# Patient Record
Sex: MALE | Race: ASIAN | NOT HISPANIC OR LATINO
[De-identification: names, ages, dates, MRNs, and addresses within clinical notes are randomized per-mention and may not be internally consistent; named-entity substitution may affect disease eponyms.]

---

## 2019-10-31 ENCOUNTER — TRANSCRIPTION ENCOUNTER (OUTPATIENT)
Age: 49
End: 2019-10-31

## 2019-12-10 ENCOUNTER — TRANSCRIPTION ENCOUNTER (OUTPATIENT)
Age: 49
End: 2019-12-10

## 2020-04-25 ENCOUNTER — MESSAGE (OUTPATIENT)
Age: 50
End: 2020-04-25

## 2020-05-03 ENCOUNTER — APPOINTMENT (OUTPATIENT)
Dept: DISASTER EMERGENCY | Facility: CLINIC | Age: 50
End: 2020-05-03

## 2020-05-04 LAB
SARS-COV-2 IGG SERPL IA-ACNC: 0 INDEX
SARS-COV-2 IGG SERPL QL IA: NEGATIVE

## 2020-09-30 ENCOUNTER — APPOINTMENT (OUTPATIENT)
Dept: ORTHOPEDIC SURGERY | Facility: CLINIC | Age: 50
End: 2020-09-30
Payer: COMMERCIAL

## 2020-09-30 VITALS
DIASTOLIC BLOOD PRESSURE: 80 MMHG | WEIGHT: 142 LBS | BODY MASS INDEX: 22.29 KG/M2 | SYSTOLIC BLOOD PRESSURE: 123 MMHG | HEART RATE: 96 BPM | OXYGEN SATURATION: 97 % | HEIGHT: 67 IN

## 2020-09-30 DIAGNOSIS — S46.011A STRAIN OF MUSCLE(S) AND TENDON(S) OF THE ROTATOR CUFF OF RIGHT SHOULDER, INITIAL ENCOUNTER: ICD-10-CM

## 2020-09-30 DIAGNOSIS — M25.511 PAIN IN RIGHT SHOULDER: ICD-10-CM

## 2020-09-30 DIAGNOSIS — S46.011S STRAIN OF MUSCLE(S) AND TENDON(S) OF THE ROTATOR CUFF OF RIGHT SHOULDER, SEQUELA: ICD-10-CM

## 2020-09-30 PROCEDURE — 99204 OFFICE O/P NEW MOD 45 MIN: CPT

## 2020-09-30 PROCEDURE — 73030 X-RAY EXAM OF SHOULDER: CPT | Mod: RT

## 2020-10-05 NOTE — HISTORY OF PRESENT ILLNESS
[___ days] : [unfilled] day(s) ago [10] : a maximum pain level of 10/10 [Constant] : ~He/She~ states the symptoms seem to be constant [Lifting] : worsened by lifting [de-identified] : Pt presents for initial evaluation with pain in his right shoulder there is no known injury. Pt states he had a similar pain 1 1/2 years ago and had physical therapy  prescribed by his PCP, that was helpful. Recently he was helping a friend moving things and that might have aggravated his right shoulder. He has taken Advil for pain with no real relief. 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 [de-identified] : certain movements [de-identified] : lying down.

## 2020-10-05 NOTE — PHYSICAL EXAM
[de-identified] : Physical examination discloses mild muscle spasm to the right para trapezial musculature of the cervical spine.  Positive impingement on overhead motion at 160 degrees.  Positive radiation of pain to the right shoulder down to his elbow. [de-identified] : X-rays taken of the right shoulder and AP transscapular lateral and axillary views are nonspecific.  No acute soft tissue or osseous changes are noted

## 2020-10-05 NOTE — DISCUSSION/SUMMARY
[de-identified] : Patient was informed of his findings and advised to start physical therapy meloxicam rest use heat and may undergo MRI evaluation of the cervical spine as well as the right shoulder if symptoms persist or worsen.  Follow-up in 2 weeks to check his progress

## 2020-10-07 ENCOUNTER — OUTPATIENT (OUTPATIENT)
Dept: OUTPATIENT SERVICES | Facility: HOSPITAL | Age: 50
LOS: 1 days | End: 2020-10-07
Payer: COMMERCIAL

## 2020-10-07 ENCOUNTER — APPOINTMENT (OUTPATIENT)
Dept: MRI IMAGING | Facility: CLINIC | Age: 50
End: 2020-10-07
Payer: COMMERCIAL

## 2020-10-07 ENCOUNTER — RESULT REVIEW (OUTPATIENT)
Age: 50
End: 2020-10-07

## 2020-10-07 DIAGNOSIS — S46.011A STRAIN OF MUSCLE(S) AND TENDON(S) OF THE ROTATOR CUFF OF RIGHT SHOULDER, INITIAL ENCOUNTER: ICD-10-CM

## 2020-10-07 PROCEDURE — 72141 MRI NECK SPINE W/O DYE: CPT

## 2020-10-07 PROCEDURE — 72141 MRI NECK SPINE W/O DYE: CPT | Mod: 26

## 2020-10-07 PROCEDURE — 73221 MRI JOINT UPR EXTREM W/O DYE: CPT | Mod: 26,RT

## 2020-10-07 PROCEDURE — 73221 MRI JOINT UPR EXTREM W/O DYE: CPT

## 2020-10-30 ENCOUNTER — APPOINTMENT (OUTPATIENT)
Dept: ORTHOPEDIC SURGERY | Facility: CLINIC | Age: 50
End: 2020-10-30
Payer: COMMERCIAL

## 2020-10-30 VITALS
OXYGEN SATURATION: 97 % | DIASTOLIC BLOOD PRESSURE: 83 MMHG | BODY MASS INDEX: 22.29 KG/M2 | SYSTOLIC BLOOD PRESSURE: 124 MMHG | HEART RATE: 89 BPM | HEIGHT: 67 IN | WEIGHT: 142 LBS

## 2020-10-30 PROCEDURE — 99214 OFFICE O/P EST MOD 30 MIN: CPT

## 2020-10-30 PROCEDURE — 99072 ADDL SUPL MATRL&STAF TM PHE: CPT

## 2020-10-30 RX ORDER — METHYLPREDNISOLONE 4 MG/1
4 TABLET ORAL
Qty: 1 | Refills: 0 | Status: ACTIVE | COMMUNITY
Start: 2020-10-30 | End: 1900-01-01

## 2020-10-30 NOTE — HISTORY OF PRESENT ILLNESS
[___ mths] : [unfilled] month(s) ago [10] : a maximum pain level of 10/10 [Constant] : ~He/She~ states the symptoms seem to be constant [Lifting] : worsened by lifting [Ice] : relieved by ice [Rest] : relieved by rest [de-identified] : Pt returns for follow up for pain in his right shoulder, pt is right hand dominant, pt  had MRI cervical and Right shoulder  on 10/7/2020, pt has been attending physical therapy for 5 sessions and feels he is back to his original pain. Pt is taking meloxicam with no real relief.  [de-identified] : certain movements

## 2020-10-30 NOTE — DISCUSSION/SUMMARY
[de-identified] : Patient's condition was reviewed with his MRI findings of his shoulder and neck.  The patient was advised to seek further follow-up assessment and treatment under the expertise of a spine orthopedic specialist as well as referral to pain management.  Interim he was placed on a Medrol Dosepak

## 2020-10-30 NOTE — PHYSICAL EXAM
[de-identified] : Physical examination of the patient's right shoulder is nonspecific.  His pain emanates from the base of his neck on the right side radiating down his arm with occasional numbness and tingling to the index and long fingers.  Diminished biceps and brachioradialis extends on the right side.

## 2020-11-04 ENCOUNTER — APPOINTMENT (OUTPATIENT)
Dept: ORTHOPEDIC SURGERY | Facility: CLINIC | Age: 50
End: 2020-11-04
Payer: COMMERCIAL

## 2020-11-04 VITALS
HEART RATE: 85 BPM | SYSTOLIC BLOOD PRESSURE: 122 MMHG | DIASTOLIC BLOOD PRESSURE: 80 MMHG | HEIGHT: 67 IN | BODY MASS INDEX: 22.29 KG/M2 | WEIGHT: 142 LBS

## 2020-11-04 DIAGNOSIS — Z78.9 OTHER SPECIFIED HEALTH STATUS: ICD-10-CM

## 2020-11-04 PROCEDURE — 99214 OFFICE O/P EST MOD 30 MIN: CPT

## 2020-11-04 PROCEDURE — 99072 ADDL SUPL MATRL&STAF TM PHE: CPT

## 2020-11-04 PROCEDURE — 72050 X-RAY EXAM NECK SPINE 4/5VWS: CPT

## 2020-11-04 RX ORDER — PREDNISONE 10 MG/1
10 TABLET ORAL
Qty: 39 | Refills: 0 | Status: ACTIVE | COMMUNITY
Start: 2020-11-04 | End: 1900-01-01

## 2020-11-04 RX ORDER — TIZANIDINE 2 MG/1
2 TABLET ORAL EVERY 6 HOURS
Qty: 24 | Refills: 0 | Status: ACTIVE | COMMUNITY
Start: 2020-11-04 | End: 1900-01-01

## 2020-11-04 RX ORDER — OMEPRAZOLE 20 MG/1
20 CAPSULE, DELAYED RELEASE ORAL TWICE DAILY
Qty: 30 | Refills: 0 | Status: ACTIVE | COMMUNITY
Start: 2020-11-04 | End: 1900-01-01

## 2020-11-04 NOTE — PHYSICAL EXAM
[de-identified] : Cervical Physical Exam\par \par Gait -normal\par \par Station -normal\par \par Sagittal balance -normal\par \par \par Horizontal gaze -maintained\par \par Heel walk -normal\par \par Toe walk -normal\par \par Reflexes\par Biceps -normal\par Triceps -normal\par Brachioradialis -normal\par Patellar -normal\par Gastroc -normal\par Clonus -no\par \par Jacobs´s -normal\par \par Shoulder Exam -normal\par \par Spurling´s -positive on right\par \par Wrist Pulses -2+ radial/ulnar\par \par Foot Pulses -2+ DP/PT\par \par Cervical range of motion -globally reduced\par \par Sensation \par C5-T1 sensation intact to light touch bilaterally except for right thumb and index finger\par \par L1-S1 sensation intact to light touch bilaterally\par \par Motor\par \par \par 	Deltoid	Biceps	Triceps	WF	WE	IO	\par Right	5/5	4/5	5/5	5/5	4/5	5/5	5/5\par Left	5/5	5/5	5/5	5/5	5/5	5/5	5/5\par \par \par 	IP	Quad	HS	TA	Gastroc	EHL\par Right	5/5	5/5	5/5	5/5	5/5	5/5\par Left	5/5	5/5	5/5	5/5	5/5	5/5\par \par \par  [de-identified] : Cervical radiographs\par C5-C6 disc degeneration\par C5-C6 disc height loss\par Cervical lordosis maintained\par \par Cervical MRI reviewed\par C5-C6 with disc herniation\par Right-sided foraminal narrowing at C5-C6\par

## 2020-11-04 NOTE — ASSESSMENT
[FreeTextEntry1] : This is a 50-year-old male that is coming in today complaining of right-sided C6 radiculopathy in the setting of a C5-C6 disc herniation.  I I believe he would benefit from a C5-C6 translaminar epidural steroid injection.  This was prescribed to him today.  I have also provided him a higher dose prednisone taper to help with his symptoms.  I gave him tizanidine to help with some of his neck symptoms and counseled him on appropriate dose of Tylenol and ibuprofen.  Finally I did prescribe him omeprazole to help protect his stomach.  I will have him follow-up in 1 to 2 weeks to ensure that he is progressing in the right direction after his epidural.  He knows to call in the interim if his symptoms worsen.

## 2020-11-04 NOTE — HISTORY OF PRESENT ILLNESS
[de-identified] : This is a 50-year-old male that is coming in today complaining of neck pain as well as right-sided radicular type symptoms.  He has majority arm pain, 80% arm pain, 20% neck pain.  He states the pain radiates down from his right shoulder from his neck down to his elbow over his lateral forearm.  He endorses numbness over his thumb and index finger.  He denies any balance issues, denies any hand dexterity issues, denies any issues buttoning his shirts.  The symptoms have progressively gotten worse over the past 5 weeks.

## 2020-11-05 RX ORDER — IBUPROFEN 800 MG/1
800 TABLET, FILM COATED ORAL 3 TIMES DAILY
Qty: 28 | Refills: 0 | Status: ACTIVE | COMMUNITY
Start: 2020-11-05 | End: 1900-01-01

## 2020-11-06 ENCOUNTER — APPOINTMENT (OUTPATIENT)
Dept: PHYSICAL MEDICINE AND REHAB | Facility: CLINIC | Age: 50
End: 2020-11-06
Payer: COMMERCIAL

## 2020-11-06 VITALS
BODY MASS INDEX: 22.29 KG/M2 | WEIGHT: 142 LBS | OXYGEN SATURATION: 95 % | TEMPERATURE: 97.2 F | HEART RATE: 94 BPM | HEIGHT: 67 IN | SYSTOLIC BLOOD PRESSURE: 126 MMHG | DIASTOLIC BLOOD PRESSURE: 85 MMHG

## 2020-11-06 DIAGNOSIS — M50.222 OTHER CERVICAL DISC DISPLACEMENT AT C5-C6 LEVEL: ICD-10-CM

## 2020-11-06 DIAGNOSIS — M62.838 OTHER MUSCLE SPASM: ICD-10-CM

## 2020-11-06 DIAGNOSIS — M50.30 OTHER CERVICAL DISC DEGENERATION, UNSPECIFIED CERVICAL REGION: ICD-10-CM

## 2020-11-06 PROCEDURE — 99072 ADDL SUPL MATRL&STAF TM PHE: CPT

## 2020-11-06 PROCEDURE — 99204 OFFICE O/P NEW MOD 45 MIN: CPT

## 2020-11-06 RX ORDER — GABAPENTIN 300 MG/1
300 CAPSULE ORAL 3 TIMES DAILY
Qty: 90 | Refills: 0 | Status: ACTIVE | COMMUNITY
Start: 2020-11-06 | End: 1900-01-01

## 2020-11-06 RX ORDER — METFORMIN HYDROCHLORIDE 625 MG/1
TABLET ORAL
Refills: 0 | Status: ACTIVE | COMMUNITY

## 2020-11-06 RX ORDER — MELOXICAM 15 MG/1
15 TABLET ORAL
Qty: 30 | Refills: 1 | Status: DISCONTINUED | COMMUNITY
Start: 2020-09-30 | End: 2020-11-06

## 2020-11-06 NOTE — HISTORY OF PRESENT ILLNESS
[FreeTextEntry1] : Mr. CAMERON PATEL  is a 50 year old male who presents with neck pain. Patient referred by Dr. Silver for consideration of an VINNY. \par \par Location:Right side of neck down R arm\par Onset:5-6 weeks ago, started after a paintball match\par Provocation/Palliative:Worse with activity, sitting up, better somewhat when lahying down\par Quality:Stiffness, with sharp pain\par Radiation:Down R arm into R hand \par Severity:10/10\par Timing:Worsening over last 5 weeks\par \par Numbness in 1st and 2nd digit on right hand. Has about R hand weakness. Denies any loss of bowel/bladder control or any groin numbness.\par \par Previous medications trialed:Prednisone, Ibuprofen, muscle relaxants\par Previous procedures relevant to complaint:None\par Has tried conservative treatment?:NSAIDs/Steroids/Muscle relaxants and PT

## 2020-11-06 NOTE — ASSESSMENT
[FreeTextEntry1] : After review of the history and physical examination the patient's symptoms are consistent with cervical radiculopathy. The diagnosis was discussed in detail with the patient. We discussed all the potential treatment options including physical therapy, oral medication, interventional spine procedures, and surgery; as well as alternative therapeutics such as acupuncture and massage. We also discussed the importance of good ergonomics and posture in the long term management of the condition. At this time, will recommend the following:\par - Offered patient an VINNY (C7-T1 ILESI). Explained R/B/A to procedure. Patient inquired about any medication alternatives. Will start patient on Gabapentin 300mg Qhs with titration up to 300mg TID. Patient and wife given titration schedule, advised them of potential sedation. If patient does not improve, patient is then agreeable to try an VINNY. The patient expressed verbal understanding and is in agreement with the plan of care. All of the patient's questions and concerns were addressed during today's visit.Patient educated on red flag signs including any changes to his bowel/bladder control, groin numbness or new weakness. Patient knows to seek immediate attention by calling 911 or going to nearest ER if these symptoms appear.

## 2020-11-06 NOTE — PHYSICAL EXAM
[FreeTextEntry1] : PE:\par Constitutional: In NAD, calm and cooperative\par HEENT: NCAT, Anicteric sclera, no lid-lag\par Cardio: Extremities appear pink and well perfused, no peripheral edema\par Respiratory: Normal respiratory effort on room air, no accessory muscle use\par Skin: no rashes seen on exposed skin, no visible abrasions\par Psych: Normal affect, intact judgment and insight\par Neuro: Awake, alert and oriented x 3, see below for focused neurological exam\par MSK (Neck):\par 	Inspection: no gross swelling identified\par 	Palpation: Tenderness of the right lower cervical paraspinals\par 	ROM: Pain at end cervical extension\par 	Strength: 5/5 strength in bilateral upper extremities except for R shoulder abduction/elbow flexion/extension which is 4+/5 due to pain\par 	Reflexes: 2+ Biceps/Brachioradialis reflex bilaterally, Jacobs’s negative bilaterally\par 	Sensation: Abnormal sensation to light touch over right lateral arm/forearm\par 	Special tests: Spurling’s test positive on right

## 2020-11-06 NOTE — DATA REVIEWED
[MRI] : MRI [FreeTextEntry1] : MRI C Spine: C5-6 bulge w/ mod/severe foraminal narrowing\par \par \par  MR Cervical Spine No Cont             Final\par \par No Documents Attached\par \par \par \par \par   EXAM:  MR SPINE CERVICAL\par \par \par PROCEDURE DATE:  10/07/2020\par \par \par \par INTERPRETATION:  EXAMINATION: MRI of the cervical spine without contrast\par \par CLINICAL INFORMATION: Neck pain\par \par TECHNIQUE: Multiplanar, multisequential MR imaging was performed.\par \par FINDINGS: The cervical cord is normal in signal. Vertebral body heights are maintained. There is minimal anterolisthesis at C4-C5. Alignment is otherwise normal. There is multilevel disc degeneration. There is mild disc height loss at C5-C6.\par \par C2-C3: No spinal canal stenosis or foraminal narrowing.\par \par C3-C4: Minimal disc bulge. No spinal canal stenosis or foraminal narrowing.\par \par C4-C5: Minimal disc bulge. No spinal canal stenosis or foraminal narrowing.\par \par C5-C6: Disc bulge with osseous ridging. Moderate to severe bilateral foraminal narrowing. No spinal canal stenosis.\par \par C6-C7: Very small right paracentral disc protrusion without cord impingement. No spinal canal stenosis or foraminal narrowing.\par \par C7-T1: Very small central disc protrusion without cord impingement. No spinal canal stenosis or foraminal narrowing.\par \par There is no paraspinal muscle atrophy or edema.\par \par IMPRESSION: Multilevel spondylosis, most pronounced at the C5-6 level where there is a disc bulge with osseous ridging contributing to moderate to severe bilateral foraminal narrowing.\par \par \par \par \par \par \par ALEXYS NOVOA M.D., ATTENDING RADIOLOGIST\par This document has been electronically signed. Oct  8 2020  3:12PM\par \par  \par \par  Ordered by: KIANNA CHAIDEZ       Collected/Examined: 07Oct2020 08:19PM       \par Verified by: KIANNA CHAIDEZ 09Oct2020 08:11AM       \par  Result Communication: No patient communication needed at this time;\par Stage: Final       \par  Performed at: [facility]       Resulted: 08Oct2020 03:15PM       Last Updated: [id number] 08:25PM       Accession: [id number]

## 2020-12-23 ENCOUNTER — APPOINTMENT (OUTPATIENT)
Dept: ORTHOPEDIC SURGERY | Facility: CLINIC | Age: 50
End: 2020-12-23
Payer: COMMERCIAL

## 2020-12-23 PROCEDURE — 99214 OFFICE O/P EST MOD 30 MIN: CPT

## 2020-12-23 PROCEDURE — 99072 ADDL SUPL MATRL&STAF TM PHE: CPT

## 2020-12-23 NOTE — ASSESSMENT
[FreeTextEntry1] : This is a 50-year-old male that originally came in with significant neck and right arm pain.  Thankfully with conservative treatment he has had significant improvement in his symptoms.  He does not have any weakness on his right side which has improved since his last exam.  Overall he is pleased with his progress.  I discussed a regimen of home exercises which I think will help his symptoms.  We will have him follow-up in 2 months or earlier if his symptoms worsen.  He knows to call at any point if his symptoms change or worsen in any way.

## 2020-12-23 NOTE — PHYSICAL EXAM
[de-identified] : Cervical Physical Exam\par \par Gait -normal\par \par Station -normal\par \par Sagittal balance -normal\par \par \par Horizontal gaze -maintained\par \par Heel walk -normal\par \par Toe walk -normal\par \par Reflexes\par Biceps -normal\par Triceps -normal\par Brachioradialis -normal\par Patellar -normal\par Gastroc -normal\par Clonus -no\par \par Jacobs´s -normal\par \par Shoulder Exam -normal\par \par Spurling´s -positive on right\par \par Wrist Pulses -2+ radial/ulnar\par \par Foot Pulses -2+ DP/PT\par \par Cervical range of motion -globally reduced\par \par Sensation \par C5-T1 sensation intact to light touch bilaterally except for right thumb and index finger\par \par L1-S1 sensation intact to light touch bilaterally\par \par Motor\par \par \par 	Deltoid	Biceps	Triceps	WF	WE	IO	\par Right	5/5	5/5	5/5	5/5	5/5	5/5	5/5\par Left	5/5	5/5	5/5	5/5	5/5	5/5	5/5\par \par \par 	IP	Quad	HS	TA	Gastroc	EHL\par Right	5/5	5/5	5/5	5/5	5/5	5/5\par Left	5/5	5/5	5/5	5/5	5/5	5/5\par \par \par Exam greatly improved today

## 2020-12-23 NOTE — HISTORY OF PRESENT ILLNESS
[de-identified] : This is a 50-year-old male that is coming in today  stating that his neck and arm pains have both greatly improved.  He had majority arm pain, 80% arm pain, 20% neck pain.  Today he says that his pain has largely resolved.  He does have occasional pain over his right elbow and over his right lateral arm.  This pain, however, is not very debilitating.  He is able to do the majority of his activities of daily living without issue.

## 2021-02-02 DIAGNOSIS — M54.12 RADICULOPATHY, CERVICAL REGION: ICD-10-CM

## 2022-01-18 ENCOUNTER — NON-APPOINTMENT (OUTPATIENT)
Age: 52
End: 2022-01-18

## 2022-12-01 ENCOUNTER — APPOINTMENT (OUTPATIENT)
Dept: OTOLARYNGOLOGY | Facility: CLINIC | Age: 52
End: 2022-12-01

## 2022-12-01 ENCOUNTER — NON-APPOINTMENT (OUTPATIENT)
Age: 52
End: 2022-12-01

## 2022-12-01 VITALS
SYSTOLIC BLOOD PRESSURE: 123 MMHG | HEART RATE: 81 BPM | DIASTOLIC BLOOD PRESSURE: 83 MMHG | TEMPERATURE: 97.6 F | BODY MASS INDEX: 20.92 KG/M2 | WEIGHT: 138 LBS | HEIGHT: 68 IN

## 2022-12-01 DIAGNOSIS — K21.9 GASTRO-ESOPHAGEAL REFLUX DISEASE W/OUT ESOPHAGITIS: ICD-10-CM

## 2022-12-01 DIAGNOSIS — H90.3 SENSORINEURAL HEARING LOSS, BILATERAL: ICD-10-CM

## 2022-12-01 DIAGNOSIS — J34.2 DEVIATED NASAL SEPTUM: ICD-10-CM

## 2022-12-01 PROCEDURE — 31575 DIAGNOSTIC LARYNGOSCOPY: CPT

## 2022-12-01 PROCEDURE — 99204 OFFICE O/P NEW MOD 45 MIN: CPT | Mod: 25

## 2022-12-01 PROCEDURE — 92557 COMPREHENSIVE HEARING TEST: CPT

## 2022-12-01 PROCEDURE — 92567 TYMPANOMETRY: CPT

## 2022-12-01 RX ORDER — AMOXICILLIN AND CLAVULANATE POTASSIUM 500; 125 MG/1; MG/1
500-125 TABLET, FILM COATED ORAL
Qty: 20 | Refills: 0 | Status: ACTIVE | COMMUNITY
Start: 2022-07-21

## 2022-12-01 RX ORDER — PROMETHAZINE HYDROCHLORIDE AND DEXTROMETHORPHAN HYDROBROMIDE ORAL SOLUTION 15; 6.25 MG/5ML; MG/5ML
6.25-15 SOLUTION ORAL
Qty: 180 | Refills: 0 | Status: ACTIVE | COMMUNITY
Start: 2022-07-21

## 2022-12-01 RX ORDER — FLUTICASONE PROPIONATE 50 UG/1
50 SPRAY, METERED NASAL
Qty: 16 | Refills: 0 | Status: ACTIVE | COMMUNITY
Start: 2022-07-21

## 2022-12-01 RX ORDER — HYDROCORTISONE 25 MG/G
2.5 CREAM TOPICAL
Qty: 56 | Refills: 0 | Status: ACTIVE | COMMUNITY
Start: 2022-07-21

## 2022-12-01 NOTE — HISTORY OF PRESENT ILLNESS
[de-identified] : 53 yo male\par PAtient complains of decreased hearing x 3 years. Very hard to hear when he is in a crowded area, tends to ask people to repeat themselves. He wears hearing aids and still difficult to hear. A night his throat feels really dry, usually wakes up in the middle of the night to drink. \par Nasal congestion in am only\par no heartburn\par Notes deflection of nasal dorsum [Tinnitus] : no tinnitus [Dizziness] : no dizziness [Hearing Loss] : hearing loss [Eustachian Tube Dysfunction] : no eustachian tube dysfunction [Cholesteatoma] : no cholesteatoma [Loud Noise Exposure] : no history of loud noise exposure [Smoking] : no smoking [Early Onset Hearing Loss] : no early onset hearing loss [Stroke] : no stroke [Nasal Congestion] : no nasal congestion [Allergic Rhinitis] : no allergic rhinitis [Adenoidectomy] : no adenoidectomy [Allergies] : no allergies [Asthma] : no asthma [None] : No associated symptoms are reported.

## 2022-12-01 NOTE — END OF VISIT
[FreeTextEntry3] : I personally saw and examined CAMERON PATEL in detail. I spoke to NIKKI Corado regarding the assessment and plan of care. I reviewed the above assessment and plan of care, and agree. I have made changes in changes in the body of the note where appropriate.I personally reviewed the HPI, PMH, FH, SH, ROS and medications/allergies. I have spoken to NIKKI Corado regarding the history and have personally determined the assessment and plan of care, and documented this myself. I was present and participated in all key portions of the encounter and all procedures noted above. I have made changes in the body of the note where appropriate.\par \par Attesting Faculty: See Attending Signature Below \par \par \par  [Time Spent: ___ minutes] : I have spent [unfilled] minutes of time on the encounter.

## 2022-12-01 NOTE — DATA REVIEWED
[de-identified] : Hearing Test performed to evaluate the extent of hearing loss and  to explain pt's symptoms\par today's hearing test was personally reviewed and revealed\par WNL steeply sloping to mod-severe SNHL in both ears

## 2022-12-01 NOTE — PROCEDURE
[de-identified] : Reason for the procedure-throat symptoms not adequately evaluated by mirror exam\par After informed verbal consent is obtained. \par The fiberoptic laryngoscope #3 is passed via the  nasal cavity. There is no adenoid hypertrophy of the nasopharynx.  \par The hypopharynx is clear with no lesions or masses. \par The vocal cords are clear intact, within normal limits and mobile bilaterally with  \par evidence of posterior laryngeal erythema and edema and erythema of the arytenoids.\par \par Tongue Base-wnl\par Larynx-wnl\par Hypopharynx-wnl\par tongue base, \par vallecular-wnl\par epiglottis-wnl\par subglottis-wnl\par posterior pharyngeal wall-wnl\par \par true vocal cords-wnl\par arytenoids-erythema and edema\par false vocal cords-wnl\par ventricles-wnl \par pyriform sinus-wnl no pooling\par aryepiglottic folds-wnl\par \par

## 2022-12-01 NOTE — ASSESSMENT
[FreeTextEntry1] : Mr. PATEL 52 year M w/ hx of SNHL with HA complains hearing is progressively getting worse. Hard to hear when it a crowded area. Also complains of dry throat at night x 1 month \par \par Bilateral SNHL:\par -cleared for hearing aids\par -audio reviewed \par -avoid loud noise exposure \par -Hearing Test performed to evaluate the extent of hearing loss and to explain pt's symptoms\par \par Dry Throat secondary to GERD:\par -Antireflux recommendations were given to the patient\par -Maalox and omeprazole prescribed\par -A formal GI evaluation may be needed and was discussed with the patient.\par \par f/u 1 month or prn

## 2022-12-14 ENCOUNTER — APPOINTMENT (OUTPATIENT)
Dept: PHARMACY | Facility: CLINIC | Age: 52
End: 2022-12-14

## 2022-12-14 PROCEDURE — V5010 ASSESSMENT FOR HEARING AID: CPT | Mod: NC

## 2022-12-15 ENCOUNTER — APPOINTMENT (OUTPATIENT)
Dept: PHARMACY | Facility: CLINIC | Age: 52
End: 2022-12-15

## 2022-12-15 PROCEDURE — V5010 ASSESSMENT FOR HEARING AID: CPT

## 2022-12-27 ENCOUNTER — APPOINTMENT (OUTPATIENT)
Dept: PHARMACY | Facility: CLINIC | Age: 52
End: 2022-12-27
Payer: COMMERCIAL

## 2022-12-27 PROCEDURE — V5261A: CUSTOM

## 2022-12-29 ENCOUNTER — APPOINTMENT (OUTPATIENT)
Dept: PHARMACY | Facility: CLINIC | Age: 52
End: 2022-12-29

## 2023-01-04 ENCOUNTER — APPOINTMENT (OUTPATIENT)
Dept: OTOLARYNGOLOGY | Facility: CLINIC | Age: 53
End: 2023-01-04

## 2023-01-19 ENCOUNTER — APPOINTMENT (OUTPATIENT)
Dept: PHARMACY | Facility: CLINIC | Age: 53
End: 2023-01-19
Payer: SELF-PAY

## 2023-01-19 PROCEDURE — V5299A: CUSTOM | Mod: NC

## 2023-02-03 ENCOUNTER — APPOINTMENT (OUTPATIENT)
Dept: PHARMACY | Facility: CLINIC | Age: 53
End: 2023-02-03

## 2023-02-16 ENCOUNTER — APPOINTMENT (OUTPATIENT)
Dept: PHARMACY | Facility: CLINIC | Age: 53
End: 2023-02-16
Payer: SELF-PAY

## 2023-02-16 PROCEDURE — V5299A: CUSTOM | Mod: NC

## 2023-03-01 ENCOUNTER — NON-APPOINTMENT (OUTPATIENT)
Age: 53
End: 2023-03-01

## 2023-03-21 ENCOUNTER — APPOINTMENT (OUTPATIENT)
Dept: PHARMACY | Facility: CLINIC | Age: 53
End: 2023-03-21
Payer: SELF-PAY

## 2023-03-21 PROCEDURE — V5299A: CUSTOM | Mod: NC

## 2023-04-25 ENCOUNTER — APPOINTMENT (OUTPATIENT)
Dept: PHARMACY | Facility: CLINIC | Age: 53
End: 2023-04-25
Payer: SELF-PAY

## 2023-04-25 PROCEDURE — V5299A: CUSTOM | Mod: NC

## 2023-07-06 ENCOUNTER — APPOINTMENT (OUTPATIENT)
Dept: ORTHOPEDIC SURGERY | Facility: CLINIC | Age: 53
End: 2023-07-06
Payer: COMMERCIAL

## 2023-07-06 VITALS — BODY MASS INDEX: 20.92 KG/M2 | WEIGHT: 138 LBS | HEIGHT: 68 IN

## 2023-07-06 DIAGNOSIS — M54.50 LOW BACK PAIN, UNSPECIFIED: ICD-10-CM

## 2023-07-06 DIAGNOSIS — M51.36 OTHER INTERVERTEBRAL DISC DEGENERATION, LUMBAR REGION: ICD-10-CM

## 2023-07-06 DIAGNOSIS — M50.30 OTHER CERVICAL DISC DEGENERATION, UNSPECIFIED CERVICAL REGION: ICD-10-CM

## 2023-07-06 PROCEDURE — 99204 OFFICE O/P NEW MOD 45 MIN: CPT

## 2023-07-06 PROCEDURE — 72100 X-RAY EXAM L-S SPINE 2/3 VWS: CPT

## 2023-07-06 PROCEDURE — 72040 X-RAY EXAM NECK SPINE 2-3 VW: CPT

## 2023-07-06 RX ORDER — TIZANIDINE 2 MG/1
2 TABLET ORAL EVERY 6 HOURS
Qty: 120 | Refills: 1 | Status: ACTIVE | COMMUNITY
Start: 2023-07-06 | End: 1900-01-01

## 2023-07-06 RX ORDER — MELOXICAM 15 MG/1
15 TABLET ORAL
Qty: 30 | Refills: 1 | Status: ACTIVE | COMMUNITY
Start: 2023-07-06 | End: 1900-01-01

## 2023-07-28 ENCOUNTER — TRANSCRIPTION ENCOUNTER (OUTPATIENT)
Age: 53
End: 2023-07-28

## 2023-08-02 ENCOUNTER — TRANSCRIPTION ENCOUNTER (OUTPATIENT)
Age: 53
End: 2023-08-02

## 2023-08-07 ENCOUNTER — OUTPATIENT (OUTPATIENT)
Dept: OUTPATIENT SERVICES | Facility: HOSPITAL | Age: 53
LOS: 1 days | End: 2023-08-07
Payer: COMMERCIAL

## 2023-08-07 ENCOUNTER — APPOINTMENT (OUTPATIENT)
Dept: ULTRASOUND IMAGING | Facility: CLINIC | Age: 53
End: 2023-08-07
Payer: COMMERCIAL

## 2023-08-07 DIAGNOSIS — Z12.9 ENCOUNTER FOR SCREENING FOR MALIGNANT NEOPLASM, SITE UNSPECIFIED: ICD-10-CM

## 2023-08-07 DIAGNOSIS — Z00.8 ENCOUNTER FOR OTHER GENERAL EXAMINATION: ICD-10-CM

## 2023-08-07 PROCEDURE — 76700 US EXAM ABDOM COMPLETE: CPT | Mod: 26

## 2023-08-07 PROCEDURE — 76700 US EXAM ABDOM COMPLETE: CPT

## 2023-12-22 ENCOUNTER — NON-APPOINTMENT (OUTPATIENT)
Age: 53
End: 2023-12-22

## 2024-02-07 ENCOUNTER — TRANSCRIPTION ENCOUNTER (OUTPATIENT)
Age: 54
End: 2024-02-07

## 2024-02-08 DIAGNOSIS — E11.9 TYPE 2 DIABETES MELLITUS W/OUT COMPLICATIONS: ICD-10-CM

## 2024-02-08 RX ORDER — BLOOD-GLUCOSE CONTROL, NORMAL
EACH MISCELLANEOUS
Qty: 1 | Refills: 0 | Status: ACTIVE | COMMUNITY
Start: 2024-02-08 | End: 1900-01-01

## 2024-02-08 RX ORDER — BLOOD-GLUCOSE METER
W/DEVICE EACH MISCELLANEOUS
Qty: 1 | Refills: 0 | Status: ACTIVE | COMMUNITY
Start: 2024-02-08 | End: 1900-01-01

## 2024-04-26 RX ORDER — BLOOD SUGAR DIAGNOSTIC
STRIP MISCELLANEOUS
Qty: 200 | Refills: 3 | Status: ACTIVE | COMMUNITY
Start: 2024-02-08 | End: 1900-01-01

## 2024-04-26 RX ORDER — LANCETS 33 GAUGE
EACH MISCELLANEOUS
Qty: 200 | Refills: 3 | Status: ACTIVE | COMMUNITY
Start: 2024-02-08 | End: 1900-01-01